# Patient Record
(demographics unavailable — no encounter records)

---

## 2025-01-15 NOTE — PHYSICAL EXAM
[Chaperone Present] : A chaperone was present in the examining room during all aspects of the physical examination [71011] : A chaperone was present during the pelvic exam. [FreeTextEntry2] : Kavya Zuniga